# Patient Record
Sex: MALE | Race: WHITE | NOT HISPANIC OR LATINO | Employment: FULL TIME | URBAN - METROPOLITAN AREA
[De-identification: names, ages, dates, MRNs, and addresses within clinical notes are randomized per-mention and may not be internally consistent; named-entity substitution may affect disease eponyms.]

---

## 2018-05-29 ENCOUNTER — HOSPITAL ENCOUNTER (EMERGENCY)
Facility: MEDICAL CENTER | Age: 53
End: 2018-05-29
Attending: EMERGENCY MEDICINE
Payer: COMMERCIAL

## 2018-05-29 VITALS
HEIGHT: 73 IN | OXYGEN SATURATION: 100 % | RESPIRATION RATE: 16 BRPM | SYSTOLIC BLOOD PRESSURE: 144 MMHG | HEART RATE: 55 BPM | TEMPERATURE: 97.2 F | DIASTOLIC BLOOD PRESSURE: 78 MMHG | WEIGHT: 197.97 LBS | BODY MASS INDEX: 26.24 KG/M2

## 2018-05-29 DIAGNOSIS — L97.502 ULCER OF FOOT WITH FAT LAYER EXPOSED, UNSPECIFIED LATERALITY (HCC): ICD-10-CM

## 2018-05-29 LAB
ALBUMIN SERPL BCP-MCNC: 4.4 G/DL (ref 3.2–4.9)
ALBUMIN/GLOB SERPL: 1.8 G/DL
ALP SERPL-CCNC: 68 U/L (ref 30–99)
ALT SERPL-CCNC: 40 U/L (ref 2–50)
ANION GAP SERPL CALC-SCNC: 9 MMOL/L (ref 0–11.9)
AST SERPL-CCNC: 29 U/L (ref 12–45)
BASOPHILS # BLD AUTO: 0.5 % (ref 0–1.8)
BASOPHILS # BLD: 0.05 K/UL (ref 0–0.12)
BILIRUB SERPL-MCNC: 0.6 MG/DL (ref 0.1–1.5)
BUN SERPL-MCNC: 25 MG/DL (ref 8–22)
CALCIUM SERPL-MCNC: 9.4 MG/DL (ref 8.5–10.5)
CHLORIDE SERPL-SCNC: 105 MMOL/L (ref 96–112)
CO2 SERPL-SCNC: 25 MMOL/L (ref 20–33)
CREAT SERPL-MCNC: 0.77 MG/DL (ref 0.5–1.4)
CRP SERPL HS-MCNC: 0.22 MG/DL (ref 0–0.75)
EOSINOPHIL # BLD AUTO: 0.16 K/UL (ref 0–0.51)
EOSINOPHIL NFR BLD: 1.5 % (ref 0–6.9)
ERYTHROCYTE [DISTWIDTH] IN BLOOD BY AUTOMATED COUNT: 43.4 FL (ref 35.9–50)
ERYTHROCYTE [SEDIMENTATION RATE] IN BLOOD BY WESTERGREN METHOD: 6 MM/HOUR (ref 0–20)
GLOBULIN SER CALC-MCNC: 2.5 G/DL (ref 1.9–3.5)
GLUCOSE SERPL-MCNC: 109 MG/DL (ref 65–99)
HCT VFR BLD AUTO: 48 % (ref 42–52)
HGB BLD-MCNC: 15.8 G/DL (ref 14–18)
IMM GRANULOCYTES # BLD AUTO: 0.04 K/UL (ref 0–0.11)
IMM GRANULOCYTES NFR BLD AUTO: 0.4 % (ref 0–0.9)
LYMPHOCYTES # BLD AUTO: 0.98 K/UL (ref 1–4.8)
LYMPHOCYTES NFR BLD: 9 % (ref 22–41)
MCH RBC QN AUTO: 30.6 PG (ref 27–33)
MCHC RBC AUTO-ENTMCNC: 32.9 G/DL (ref 33.7–35.3)
MCV RBC AUTO: 92.8 FL (ref 81.4–97.8)
MONOCYTES # BLD AUTO: 1.02 K/UL (ref 0–0.85)
MONOCYTES NFR BLD AUTO: 9.3 % (ref 0–13.4)
NEUTROPHILS # BLD AUTO: 8.66 K/UL (ref 1.82–7.42)
NEUTROPHILS NFR BLD: 79.3 % (ref 44–72)
NRBC # BLD AUTO: 0 K/UL
NRBC BLD-RTO: 0 /100 WBC
PLATELET # BLD AUTO: 181 K/UL (ref 164–446)
PMV BLD AUTO: 11.9 FL (ref 9–12.9)
POTASSIUM SERPL-SCNC: 4.1 MMOL/L (ref 3.6–5.5)
PROT SERPL-MCNC: 6.9 G/DL (ref 6–8.2)
RBC # BLD AUTO: 5.17 M/UL (ref 4.7–6.1)
SODIUM SERPL-SCNC: 139 MMOL/L (ref 135–145)
WBC # BLD AUTO: 10.9 K/UL (ref 4.8–10.8)

## 2018-05-29 PROCEDURE — 96365 THER/PROPH/DIAG IV INF INIT: CPT

## 2018-05-29 PROCEDURE — 85652 RBC SED RATE AUTOMATED: CPT

## 2018-05-29 PROCEDURE — 85025 COMPLETE CBC W/AUTO DIFF WBC: CPT

## 2018-05-29 PROCEDURE — 87205 SMEAR GRAM STAIN: CPT

## 2018-05-29 PROCEDURE — 87186 SC STD MICRODIL/AGAR DIL: CPT

## 2018-05-29 PROCEDURE — 80053 COMPREHEN METABOLIC PANEL: CPT

## 2018-05-29 PROCEDURE — 700111 HCHG RX REV CODE 636 W/ 250 OVERRIDE (IP): Performed by: EMERGENCY MEDICINE

## 2018-05-29 PROCEDURE — 87077 CULTURE AEROBIC IDENTIFY: CPT

## 2018-05-29 PROCEDURE — 87070 CULTURE OTHR SPECIMN AEROBIC: CPT

## 2018-05-29 PROCEDURE — 99284 EMERGENCY DEPT VISIT MOD MDM: CPT

## 2018-05-29 PROCEDURE — 86140 C-REACTIVE PROTEIN: CPT

## 2018-05-29 PROCEDURE — 700105 HCHG RX REV CODE 258: Performed by: EMERGENCY MEDICINE

## 2018-05-29 RX ORDER — AMOXICILLIN AND CLAVULANATE POTASSIUM 875; 125 MG/1; MG/1
1 TABLET, FILM COATED ORAL 2 TIMES DAILY
Qty: 20 TAB | Refills: 0 | Status: SHIPPED | OUTPATIENT
Start: 2018-05-29 | End: 2018-06-08

## 2018-05-29 RX ADMIN — AMPICILLIN SODIUM AND SULBACTAM SODIUM 3 G: 2; 1 INJECTION, POWDER, FOR SOLUTION INTRAMUSCULAR; INTRAVENOUS at 21:23

## 2018-05-29 ASSESSMENT — PAIN SCALES - GENERAL: PAINLEVEL_OUTOF10: 3

## 2018-05-30 LAB
GRAM STN SPEC: NORMAL
SIGNIFICANT IND 70042: NORMAL
SITE SITE: NORMAL
SOURCE SOURCE: NORMAL

## 2018-05-30 NOTE — DISCHARGE INSTRUCTIONS
Diabetes and Foot Care  Diabetes may cause you to have problems because of poor blood supply (circulation) to your feet and legs. This may cause the skin on your feet to become thinner, break easier, and heal more slowly. Your skin may become dry, and the skin may peel and crack. You may also have nerve damage in your legs and feet causing decreased feeling in them. You may not notice minor injuries to your feet that could lead to infections or more serious problems. Taking care of your feet is one of the most important things you can do for yourself.  Follow these instructions at home:  · Wear shoes at all times, even in the house. Do not go barefoot. Bare feet are easily injured.  · Check your feet daily for blisters, cuts, and redness. If you cannot see the bottom of your feet, use a mirror or ask someone for help.  · Wash your feet with warm water (do not use hot water) and mild soap. Then pat your feet and the areas between your toes until they are completely dry. Do not soak your feet as this can dry your skin.  · Apply a moisturizing lotion or petroleum jelly (that does not contain alcohol and is unscented) to the skin on your feet and to dry, brittle toenails. Do not apply lotion between your toes.  · Trim your toenails straight across. Do not dig under them or around the cuticle. File the edges of your nails with an emery board or nail file.  · Do not cut corns or calluses or try to remove them with medicine.  · Wear clean socks or stockings every day. Make sure they are not too tight. Do not wear knee-high stockings since they may decrease blood flow to your legs.  · Wear shoes that fit properly and have enough cushioning. To break in new shoes, wear them for just a few hours a day. This prevents you from injuring your feet. Always look in your shoes before you put them on to be sure there are no objects inside.  · Do not cross your legs. This may decrease the blood flow to your feet.  · If you find a  minor scrape, cut, or break in the skin on your feet, keep it and the skin around it clean and dry. These areas may be cleansed with mild soap and water. Do not cleanse the area with peroxide, alcohol, or iodine.  · When you remove an adhesive bandage, be sure not to damage the skin around it.  · If you have a wound, look at it several times a day to make sure it is healing.  · Do not use heating pads or hot water bottles. They may burn your skin. If you have lost feeling in your feet or legs, you may not know it is happening until it is too late.  · Make sure your health care provider performs a complete foot exam at least annually or more often if you have foot problems. Report any cuts, sores, or bruises to your health care provider immediately.  Contact a health care provider if:  · You have an injury that is not healing.  · You have cuts or breaks in the skin.  · You have an ingrown nail.  · You notice redness on your legs or feet.  · You feel burning or tingling in your legs or feet.  · You have pain or cramps in your legs and feet.  · Your legs or feet are numb.  · Your feet always feel cold.  Get help right away if:  · There is increasing redness, swelling, or pain in or around a wound.  · There is a red line that goes up your leg.  · Pus is coming from a wound.  · You develop a fever or as directed by your health care provider.  · You notice a bad smell coming from an ulcer or wound.  This information is not intended to replace advice given to you by your health care provider. Make sure you discuss any questions you have with your health care provider.  Document Released: 12/15/2001 Document Revised: 05/25/2017 Document Reviewed: 05/27/2014  [employer] Interactive Patient Education © 2017 [employer]

## 2018-05-30 NOTE — ED TRIAGE NOTES
"Pt c/o sore to left 1st digit on foot. Pt states \"open sore\". Hx of same. Denies DM, states \"pre diabetic\"  "

## 2018-05-31 LAB
BACTERIA WND AEROBE CULT: ABNORMAL
BACTERIA WND AEROBE CULT: ABNORMAL
GRAM STN SPEC: ABNORMAL
SIGNIFICANT IND 70042: ABNORMAL
SITE SITE: ABNORMAL
SOURCE SOURCE: ABNORMAL

## 2018-06-01 NOTE — ED NOTES
"ED Positive Culture Follow-up/Notification Note:    Date: 06/01/2018     Patient seen in the ED on 5/29/2018 for open wound on foot.   1. Ulcer of foot with fat layer exposed, unspecified laterality (HCC)       Discharge Medication List as of 5/29/2018 10:22 PM      START taking these medications    Details   amoxicillin-clavulanate (AUGMENTIN) 875-125 MG Tab Take 1 Tab by mouth 2 times a day for 10 days., Disp-20 Tab, R-0, Print Rx Paper             Allergies: Patient has no known allergies.     Vitals:    05/29/18 1845 05/29/18 1846 05/29/18 2130 05/29/18 2200   BP: 144/78      Pulse: 83  66 (!) 55   Resp: 16      Temp: 36.2 °C (97.2 °F)      SpO2: 96%  95% 100%   Weight:  89.8 kg (197 lb 15.6 oz)     Height: 1.854 m (6' 1\")          Final cultures:   Results     Procedure Component Value Units Date/Time    CULTURE WOUND W/ GRAM STAIN [[id number]]  (Abnormal)  (Susceptibility) Collected:  05/29/18 2044    Order Status:  Completed Specimen:  Wound from Left Foot Updated:  05/31/18 1042     Significant Indicator POS (POS)     Source WND     Site LEFT FOOT     Culture Result Wound Rare growth mixed skin spencer. (A)     Gram Stain Result No organisms seen.     Culture Result Wound Staphylococcus aureus  Light growth   (A)    Culture & Susceptibility     STAPHYLOCOCCUS AUREUS     Antibiotic Sensitivity Microscan Unit Status    Ampicillin/sulbactam Sensitive <=8/4 mcg/mL Final    Method: SENSITIVITY, ALLIE    Clindamycin Sensitive <=0.5 mcg/mL Final    Method: SENSITIVITY, ALLIE    Daptomycin Sensitive <=0.5 mcg/mL Final    Method: SENSITIVITY, ALLIE    Erythromycin Intermediate 1 mcg/mL Final    Method: SENSITIVITY, ALLIE    Moxifloxacin Sensitive <=0.5 mcg/mL Final    Method: SENSITIVITY, ALLIE    Oxacillin Sensitive 0.5 mcg/mL Final    Method: SENSITIVITY, ALLIE    Penicillin Resistant >8 mcg/mL Final    Method: SENSITIVITY, ALLIE    Tetracycline Sensitive <=4 mcg/mL Final    Method: SENSITIVITY, ALLIE    Trimeth/Sulfa Sensitive " <=0.5/9.5 mcg/mL Final    Method: SENSITIVITY, ALLIE    Vancomycin Sensitive 1 mcg/mL Final    Method: SENSITIVITY, ALLIE                       GRAM STAIN [[id number]] Collected:  05/29/18 2044    Order Status:  Completed Specimen:  Wound Updated:  05/30/18 1036     Significant Indicator .     Source WND     Site LEFT FOOT     Gram Stain Result No organisms seen.          Plan:   Appropriate antibiotic therapy prescribed. No changes required based upon culture result.  Sent letter to patient to notify of positive culture result and encourage compliance with prescribed antibiotics.     Abdiel Hester, PharmD, BCPS

## 2020-10-06 NOTE — ED PROVIDER NOTES
ED Provider Note    CHIEF COMPLAINT  Chief Complaint   Patient presents with   • Foot Pain       HPI  Hortensia Wilks is a 53 y.o. male who presents for evaluation of open wound on the foot. Recent reports a history of diabetes he is on metformin. Contrary to the triage nurses note he is a diabetic. She has had an ulcer draining some purulent material on the tip of his left 1st digit over the last week. He denies high fevers or chills. He has not required any toe amputation or complex infections due to his diabetes in the past. No high fevers or chills no other symptoms reported    REVIEW OF SYSTEMS  See HPI for further details. No high fevers chills night as well as numbness tingling or weakness All other systems are negative.     PAST MEDICAL HISTORY  Past Medical History:   Diagnosis Date   • ASTHMA    • Hypertension        FAMILY HISTORY  Noncontributory    SOCIAL HISTORY  Social History     Social History   • Marital status:      Spouse name: N/A   • Number of children: N/A   • Years of education: N/A     Social History Main Topics   • Smoking status: Never Smoker   • Smokeless tobacco: Not on file   • Alcohol use Yes   • Drug use: No   • Sexual activity: Not on file     Other Topics Concern   • Not on file     Social History Narrative   • No narrative on file     Nonsmoker   SURGICAL HISTORY  No past surgical history on file.    CURRENT MEDICATIONS    Current Facility-Administered Medications:   •  ampicillin/sulbactam (UNASYN) 3 g in  mL IVPB, 3 g, Intravenous, Once, Ken Cade M.D.    Current Outpatient Prescriptions:   •  albuterol-ipratropium (COMBIVENT)  MCG/ACT AERO, Inhale 2 Puffs by mouth 4 times a day., Disp: , Rfl:   •  atenolol (TENORMIN) 100 MG TABS, Take 100 mg by mouth every day., Disp: , Rfl:   •  lisinopril (PRINIVIL) 20 MG TABS, Take 20 mg by mouth every day., Disp: , Rfl:   •  clindamycin (CLEOCIN) 300 MG CAPS, Take 1 Cap by mouth every 8 hours., Disp: 30 Cap, Rfl:  "0      ALLERGIES  No Known Allergies    PHYSICAL EXAM  VITAL SIGNS: /78   Pulse 83   Temp 36.2 °C (97.2 °F)   Resp 16   Ht 1.854 m (6' 1\")   Wt 89.8 kg (197 lb 15.6 oz)   SpO2 96%   BMI 26.12 kg/m²       Constitutional: Well developed, Well nourished, No acute distress, Non-toxic appearance.   HENT: Normocephalic, Atraumatic, Bilateral external ears normal, Oropharynx moist, No oral exudates, Nose normal.   Eyes: PERRLA, EOMI, Conjunctiva normal, No discharge.   Neck: Normal range of motion, No tenderness, Supple, No stridor.   Cardiovascular: Normal heart rate, Normal rhythm, No murmurs, No rubs, No gallops.   Thorax & Lungs: Normal breath sounds, No respiratory distress, No wheezing, No chest tenderness.   Abdomen: Bowel sounds normal, Soft, No tenderness, No masses, No pulsatile masses.   Skin: Warm, Dry, No erythema, No rash.   Extremities left lower extremity exam is notable for mild circumferential erythema of the left 1st great toe. There is a 1 x 1 cm circular ulcer with some purulent drainage on the tip  Neurologic: Alert & oriented x 3, Normal motor function, Normal sensory function, No focal deficits noted.   Psychiatric: Affect normal, Judgment normal, Mood normal.     Results for orders placed or performed during the hospital encounter of 05/29/18   CRP QUANTITIVE (NON-CARDIAC)   Result Value Ref Range    Stat C-Reactive Protein 0.22 0.00 - 0.75 mg/dL   CBC WITH DIFFERENTIAL   Result Value Ref Range    WBC 10.9 (H) 4.8 - 10.8 K/uL    RBC 5.17 4.70 - 6.10 M/uL    Hemoglobin 15.8 14.0 - 18.0 g/dL    Hematocrit 48.0 42.0 - 52.0 %    MCV 92.8 81.4 - 97.8 fL    MCH 30.6 27.0 - 33.0 pg    MCHC 32.9 (L) 33.7 - 35.3 g/dL    RDW 43.4 35.9 - 50.0 fL    Platelet Count 181 164 - 446 K/uL    MPV 11.9 9.0 - 12.9 fL    Neutrophils-Polys 79.30 (H) 44.00 - 72.00 %    Lymphocytes 9.00 (L) 22.00 - 41.00 %    Monocytes 9.30 0.00 - 13.40 %    Eosinophils 1.50 0.00 - 6.90 %    Basophils 0.50 0.00 - 1.80 %    " Immature Granulocytes 0.40 0.00 - 0.90 %    Nucleated RBC 0.00 /100 WBC    Neutrophils (Absolute) 8.66 (H) 1.82 - 7.42 K/uL    Lymphs (Absolute) 0.98 (L) 1.00 - 4.80 K/uL    Monos (Absolute) 1.02 (H) 0.00 - 0.85 K/uL    Eos (Absolute) 0.16 0.00 - 0.51 K/uL    Baso (Absolute) 0.05 0.00 - 0.12 K/uL    Immature Granulocytes (abs) 0.04 0.00 - 0.11 K/uL    NRBC (Absolute) 0.00 K/uL   COMP METABOLIC PANEL   Result Value Ref Range    Sodium 139 135 - 145 mmol/L    Potassium 4.1 3.6 - 5.5 mmol/L    Chloride 105 96 - 112 mmol/L    Co2 25 20 - 33 mmol/L    Anion Gap 9.0 0.0 - 11.9    Glucose 109 (H) 65 - 99 mg/dL    Bun 25 (H) 8 - 22 mg/dL    Creatinine 0.77 0.50 - 1.40 mg/dL    Calcium 9.4 8.5 - 10.5 mg/dL    AST(SGOT) 29 12 - 45 U/L    ALT(SGPT) 40 2 - 50 U/L    Alkaline Phosphatase 68 30 - 99 U/L    Total Bilirubin 0.6 0.1 - 1.5 mg/dL    Albumin 4.4 3.2 - 4.9 g/dL    Total Protein 6.9 6.0 - 8.2 g/dL    Globulin 2.5 1.9 - 3.5 g/dL    A-G Ratio 1.8 g/dL   ESTIMATED GFR   Result Value Ref Range    GFR If African American >60 >60 mL/min/1.73 m 2    GFR If Non African American >60 >60 mL/min/1.73 m 2      COURSE & MEDICAL DECISION MAKING  Pertinent Labs & Imaging studies reviewed. (See chart for details)  An IV was established. Laboratory studies were performed to make sure he did not have significant hyperglycemia elevated white blood cell count significant elevated inflammatory markers. All of these are actually quite good and I think he clearly has an ulcer but wouldn't characterize this as a severe diabetic ulcer. He was given IV Unasyn and I counseled him to apply antibiotic ointment to the area not peroxide or hydrocortisone. I'll start him on Augmentin for 7-10 days. Wound check either here with PCP in 2-3 days as needed    FINAL IMPRESSION  1.  1. Ulcer of foot with fat layer exposed, unspecified laterality (HCC)               Electronically signed by: Ken Cade, 5/29/2018 8:16 PM     As certified below, I, or a nurse practitioner or physician assistant working with me, had a face-to-face encounter that meets the physician face-to-face encounter requirements.

## 2021-03-15 DIAGNOSIS — Z23 NEED FOR VACCINATION: ICD-10-CM
